# Patient Record
Sex: FEMALE | Race: WHITE | Employment: OTHER | URBAN - METROPOLITAN AREA
[De-identification: names, ages, dates, MRNs, and addresses within clinical notes are randomized per-mention and may not be internally consistent; named-entity substitution may affect disease eponyms.]

---

## 2019-05-22 ENCOUNTER — HOSPITAL ENCOUNTER (OUTPATIENT)
Dept: MAMMOGRAPHY | Age: 79
Discharge: HOME | End: 2019-05-22
Attending: FAMILY MEDICINE
Payer: MEDICARE

## 2019-05-22 DIAGNOSIS — Z12.39 BREAST SCREENING: ICD-10-CM

## 2019-05-22 PROCEDURE — 77067 SCR MAMMO BI INCL CAD: CPT

## 2019-09-19 ENCOUNTER — HOSPITAL ENCOUNTER (EMERGENCY)
Age: 79
Discharge: HOME | End: 2019-09-19
Attending: EMERGENCY MEDICINE
Payer: MEDICARE

## 2019-09-19 VITALS
BODY MASS INDEX: 23.92 KG/M2 | DIASTOLIC BLOOD PRESSURE: 61 MMHG | WEIGHT: 130 LBS | HEART RATE: 76 BPM | OXYGEN SATURATION: 99 % | HEIGHT: 62 IN | RESPIRATION RATE: 9 BRPM | SYSTOLIC BLOOD PRESSURE: 134 MMHG | TEMPERATURE: 97.5 F

## 2019-09-19 DIAGNOSIS — R21 RASH: Primary | ICD-10-CM

## 2019-09-19 LAB
ANION GAP SERPL CALC-SCNC: 6 MMOL/L (ref 5–15)
BASOPHILS # BLD: 0 K/UL (ref 0–0.1)
BASOPHILS NFR BLD: 1 % (ref 0–1)
BUN SERPL-MCNC: 18 MG/DL (ref 6–20)
BUN/CREAT SERPL: 20 (ref 12–20)
CALCIUM SERPL-MCNC: 9.2 MG/DL (ref 8.5–10.1)
CHLORIDE SERPL-SCNC: 105 MMOL/L (ref 97–108)
CO2 SERPL-SCNC: 25 MMOL/L (ref 21–32)
COMMENT, HOLDF: NORMAL
CREAT SERPL-MCNC: 0.9 MG/DL (ref 0.55–1.02)
DIFFERENTIAL METHOD BLD: ABNORMAL
EOSINOPHIL # BLD: 0 K/UL (ref 0–0.4)
EOSINOPHIL NFR BLD: 1 % (ref 0–7)
ERYTHROCYTE [DISTWIDTH] IN BLOOD BY AUTOMATED COUNT: 12.6 % (ref 11.5–14.5)
GLUCOSE SERPL-MCNC: 110 MG/DL (ref 65–100)
HCT VFR BLD AUTO: 48 % (ref 35–47)
HGB BLD-MCNC: 16.6 G/DL (ref 11.5–16)
IMM GRANULOCYTES # BLD AUTO: 0 K/UL (ref 0–0.04)
IMM GRANULOCYTES NFR BLD AUTO: 0 % (ref 0–0.5)
LYMPHOCYTES # BLD: 1.1 K/UL (ref 0.8–3.5)
LYMPHOCYTES NFR BLD: 14 % (ref 12–49)
MCH RBC QN AUTO: 33.6 PG (ref 26–34)
MCHC RBC AUTO-ENTMCNC: 34.6 G/DL (ref 30–36.5)
MCV RBC AUTO: 97.2 FL (ref 80–99)
MONOCYTES # BLD: 0.7 K/UL (ref 0–1)
MONOCYTES NFR BLD: 8 % (ref 5–13)
NEUTS SEG # BLD: 6.4 K/UL (ref 1.8–8)
NEUTS SEG NFR BLD: 76 % (ref 32–75)
NRBC # BLD: 0 K/UL (ref 0–0.01)
NRBC BLD-RTO: 0 PER 100 WBC
PLATELET # BLD AUTO: 163 K/UL (ref 150–400)
PMV BLD AUTO: 11.8 FL (ref 8.9–12.9)
POTASSIUM SERPL-SCNC: 4.4 MMOL/L (ref 3.5–5.1)
RBC # BLD AUTO: 4.94 M/UL (ref 3.8–5.2)
SAMPLES BEING HELD,HOLD: NORMAL
SODIUM SERPL-SCNC: 136 MMOL/L (ref 136–145)
WBC # BLD AUTO: 8.3 K/UL (ref 3.6–11)

## 2019-09-19 PROCEDURE — 85025 COMPLETE CBC W/AUTO DIFF WBC: CPT

## 2019-09-19 PROCEDURE — 80048 BASIC METABOLIC PNL TOTAL CA: CPT

## 2019-09-19 PROCEDURE — 99285 EMERGENCY DEPT VISIT HI MDM: CPT

## 2019-09-19 PROCEDURE — 74011250636 HC RX REV CODE- 250/636: Performed by: EMERGENCY MEDICINE

## 2019-09-19 PROCEDURE — 96375 TX/PRO/DX INJ NEW DRUG ADDON: CPT

## 2019-09-19 PROCEDURE — 96374 THER/PROPH/DIAG INJ IV PUSH: CPT

## 2019-09-19 PROCEDURE — 36415 COLL VENOUS BLD VENIPUNCTURE: CPT

## 2019-09-19 RX ORDER — DIPHENHYDRAMINE HYDROCHLORIDE 50 MG/ML
50 INJECTION, SOLUTION INTRAMUSCULAR; INTRAVENOUS
Status: COMPLETED | OUTPATIENT
Start: 2019-09-19 | End: 2019-09-19

## 2019-09-19 RX ORDER — DEXAMETHASONE SODIUM PHOSPHATE 4 MG/ML
10 INJECTION, SOLUTION INTRA-ARTICULAR; INTRALESIONAL; INTRAMUSCULAR; INTRAVENOUS; SOFT TISSUE ONCE
Status: DISCONTINUED | OUTPATIENT
Start: 2019-09-19 | End: 2019-09-19

## 2019-09-19 RX ORDER — FAMOTIDINE 10 MG/ML
20 INJECTION INTRAVENOUS
Status: COMPLETED | OUTPATIENT
Start: 2019-09-19 | End: 2019-09-19

## 2019-09-19 RX ADMIN — DIPHENHYDRAMINE HYDROCHLORIDE 50 MG: 50 INJECTION, SOLUTION INTRAMUSCULAR; INTRAVENOUS at 14:43

## 2019-09-19 RX ADMIN — FAMOTIDINE 20 MG: 10 INJECTION, SOLUTION INTRAVENOUS at 14:43

## 2019-09-19 NOTE — ED TRIAGE NOTES
Pt arrives via EMS for c/o allergic reaction/hives/itching to bilateral hands, scalp, back, and legs. Unknown cause. Pt states about a weak ago she dyed her hair and started having hives shortly after that have since gotten worse, states the same thing happened 6 weeks ago. Pt reports rash to lower right leg x3 months. Pt states she took 1/2 Benadryl PTA.

## 2019-09-19 NOTE — ED PROVIDER NOTES
[de-identified] y.o. female with past medical history significant for anxiety, HTN, diverticulitis, and high cholesterol who presents from home via EMS with chief complaint of itching. Pt states about 6 weeks ago she began feeling itchy on her hands, feet, and under her arms. 7 days ago she began with a rash on her feet, hands, and under her arms that feel like \"burning\", and like \"bugs were crawling on her scalp, and back\". Pt notes she believes it is from an adverse reaction to hair dye, but is unsure. She explains she wore gloves during the process, and did not drop anything on her feet. Pt states her rash continued to get worse, and in addition, noticed her feet were getting excessively dry. Pt states she tried 7 creams on her feet to try and make them better without success. Pt states she is SOB right now, but believe it is due to her anxiety and nervousness. Pt denies changes in her urine, or stool. She also denies beginning any new medications, and the hx of any STD's. Pt took 1/2 of a benedryl PTA There are no other acute medical concerns at this time. Social hx: Never Smoker, No EtOH use  PCP: Ariel Law MD      Note written by Nemo Hill, as dictated by Miranda Godfrey DO 1:30 PM      The history is provided by the patient. No  was used.         Past Medical History:   Diagnosis Date    Anxiety     Arthritis     Diverticulitis     High cholesterol     Hypertension        Past Surgical History:   Procedure Laterality Date    HX BREAST BIOPSY Left     40+years    HX COLECTOMY           Family History:   Problem Relation Age of Onset    Breast Cancer Maternal Aunt         66's    Breast Cancer Maternal Aunt        Social History     Socioeconomic History    Marital status:      Spouse name: Not on file    Number of children: Not on file    Years of education: Not on file    Highest education level: Not on file   Occupational History    Not on file   Social Needs    Financial resource strain: Not on file    Food insecurity:     Worry: Not on file     Inability: Not on file    Transportation needs:     Medical: Not on file     Non-medical: Not on file   Tobacco Use    Smoking status: Never Smoker   Substance and Sexual Activity    Alcohol use: No    Drug use: Not on file    Sexual activity: Not on file   Lifestyle    Physical activity:     Days per week: Not on file     Minutes per session: Not on file    Stress: Not on file   Relationships    Social connections:     Talks on phone: Not on file     Gets together: Not on file     Attends Anglican service: Not on file     Active member of club or organization: Not on file     Attends meetings of clubs or organizations: Not on file     Relationship status: Not on file    Intimate partner violence:     Fear of current or ex partner: Not on file     Emotionally abused: Not on file     Physically abused: Not on file     Forced sexual activity: Not on file   Other Topics Concern    Not on file   Social History Narrative    Not on file         ALLERGIES: Ciprocinonide; Ciprofloxacin; Hydrocodone; Mefoxin [cefoxitin]; Percodan [oxycodone hcl-oxycodone-asa]; Skelaxin [metaxalone]; and Sulfa (sulfonamide antibiotics)    Review of Systems   Constitutional: Negative for fever. HENT: Negative for hearing loss. Eyes: Negative for visual disturbance. Respiratory: Positive for shortness of breath. Cardiovascular: Negative for chest pain. Gastrointestinal: Negative for abdominal pain. Genitourinary: Negative for flank pain. Musculoskeletal: Negative for back pain. Skin: Positive for rash. Neurological: Negative for dizziness and light-headedness. Psychiatric/Behavioral: The patient is nervous/anxious.         Vitals:    09/19/19 1318   BP: (!) 138/92   Pulse: 86   Resp: 15   Temp: 97.5 °F (36.4 °C)   SpO2: 100%   Weight: 59 kg (130 lb)   Height: 5' 2\" (1.575 m)            Physical Exam   Constitutional: She is oriented to person, place, and time. She appears well-developed and well-nourished. No distress. HENT:   Head: Normocephalic and atraumatic. Mouth/Throat: Oropharynx is clear and moist.   Eyes: Pupils are equal, round, and reactive to light. Conjunctivae and EOM are normal. No scleral icterus. Neck: Neck supple. No JVD present. No tracheal deviation present. Cardiovascular: Normal rate and regular rhythm. Exam reveals no gallop. No murmur heard. Pulmonary/Chest: Effort normal and breath sounds normal. No respiratory distress. Abdominal: Soft. Bowel sounds are normal. She exhibits no distension. There is no tenderness. Musculoskeletal: Normal range of motion. She exhibits no edema, tenderness or deformity. Neurological: She is alert and oriented to person, place, and time. Skin: Skin is warm and dry. Capillary refill takes less than 2 seconds. Patient has a macular papular flat blanchable rash on the palmar surfaces of both hands, the dorsal surfaces of both feet as well as around the bilateral ankles. These rashes are described as feeling itchy   Psychiatric: She has a normal mood and affect. Her behavior is normal. Judgment and thought content normal.       Note written by Nemo Mercado, as dictated by Fareed Hope, DO 1:30 PM      MDM  Number of Diagnoses or Management Options  Rash: This patient is an 58-year-old female who arrives as above. During conversation after reviewing her physical exam I believe this to be some type of reaction as opposed to something infectious. I did prescribe the patient does have Benadryl, Pepcid and Decadron. During the Decadron infusion she did have an adverse reaction where she felt very dizzy and started having some feeling that she was having trouble breathing however she did not complain of her throat swelling or tongue swelling or anything. This lasted approximately 5 minutes and then she returned to her baseline.   She did continue to have some intermittent dizziness but on my last reevaluation, patient states that she felt totally back to her baseline. Her rash has resolved at this time. As for cause of her rash is unclear however I believe it something she is most likely contacting on a daily basis. She denies any type of new items other than the soap as described in the HPI. At this time I will discharge the patient. I have asked that she use Benadryl around-the-clock for the next 48 hours as well as Pepcid daily and follow-up with her primary care doctor in reference to an allergist.  She is agreeable to this plan and disposition is hemodynamically stable      Procedures    PROGRESS NOTE:    Patient's rash improving. She now feels slightly dizzy after the medical treatment. Fluids are running.

## 2019-09-19 NOTE — DISCHARGE INSTRUCTIONS
Please take 25 mg of Benadryl every 8 hours while awake for the next 48 hours. Please take Pepcid 20 mg daily.   Follow-up with your primary care doctor talk to them about seeing an allergist.

## 2019-09-21 ENCOUNTER — HOSPITAL ENCOUNTER (EMERGENCY)
Age: 79
Discharge: HOME | End: 2019-09-21
Attending: EMERGENCY MEDICINE
Payer: MEDICARE

## 2019-09-21 VITALS
HEIGHT: 62 IN | WEIGHT: 130 LBS | OXYGEN SATURATION: 99 % | RESPIRATION RATE: 16 BRPM | BODY MASS INDEX: 23.92 KG/M2 | DIASTOLIC BLOOD PRESSURE: 72 MMHG | TEMPERATURE: 97.6 F | HEART RATE: 73 BPM | SYSTOLIC BLOOD PRESSURE: 133 MMHG

## 2019-09-21 DIAGNOSIS — R21 RASH: ICD-10-CM

## 2019-09-21 DIAGNOSIS — T78.40XD ALLERGIC REACTION, SUBSEQUENT ENCOUNTER: Primary | ICD-10-CM

## 2019-09-21 PROCEDURE — 99285 EMERGENCY DEPT VISIT HI MDM: CPT

## 2019-09-21 PROCEDURE — 74011250636 HC RX REV CODE- 250/636: Performed by: EMERGENCY MEDICINE

## 2019-09-21 PROCEDURE — 96374 THER/PROPH/DIAG INJ IV PUSH: CPT

## 2019-09-21 PROCEDURE — 96375 TX/PRO/DX INJ NEW DRUG ADDON: CPT

## 2019-09-21 RX ORDER — FAMOTIDINE 10 MG/ML
20 INJECTION INTRAVENOUS
Status: COMPLETED | OUTPATIENT
Start: 2019-09-21 | End: 2019-09-21

## 2019-09-21 RX ORDER — DIPHENHYDRAMINE HYDROCHLORIDE 50 MG/ML
50 INJECTION, SOLUTION INTRAMUSCULAR; INTRAVENOUS
Status: COMPLETED | OUTPATIENT
Start: 2019-09-21 | End: 2019-09-21

## 2019-09-21 RX ORDER — MISOPROSTOL 200 UG/1
200 TABLET ORAL ONCE
Status: DISCONTINUED | OUTPATIENT
Start: 2019-09-21 | End: 2019-09-21

## 2019-09-21 RX ADMIN — FAMOTIDINE 20 MG: 10 INJECTION, SOLUTION INTRAVENOUS at 11:12

## 2019-09-21 RX ADMIN — DIPHENHYDRAMINE HYDROCHLORIDE 50 MG: 50 INJECTION, SOLUTION INTRAMUSCULAR; INTRAVENOUS at 11:24

## 2019-09-21 NOTE — ED TRIAGE NOTES
Pt arrives by ems with the c/c of being seen recently for an allergic reaction pt states hives had gone away \"some\" but not all the way, pt has hives to bilateral hands, pt c/c today was woke up this morning and went to [facility] on \" and started experiencing \"anxiety\" with tingling in hands; pt has history of anxiety.

## 2019-09-21 NOTE — ED PROVIDER NOTES
This patient is a 69-year-old female comes in with a rash to her hands her distal arms and her feet. Patient was seen here in this emergency department several days ago for the same thing and treated with Benadryl and Pepcid with resolution of her symptoms. She is continued to use p.o. Benadryl and p.o. Pepcid ever since however this morning she had an episode where she became flushed nauseous and had what she describes as a panic attack and then broke out in this rash again on her hands distal arm and feet. Patient denies any other complaints at this time other than the itching around the rash. There is no chest pain, shortness of breath, abdominal pain. She denies any lightheadedness or dizziness. Patient had a long conversation with the patient concerning possible contacts with lotions or new detergents or anything of the like and she cannot think of anything except for some Vaseline that she used as well as some foot cream.  No other acute complaints.            Past Medical History:   Diagnosis Date    Anxiety     Arthritis     Diverticulitis     High cholesterol     Hypertension        Past Surgical History:   Procedure Laterality Date    HX BREAST BIOPSY Left     40+years    HX COLECTOMY           Family History:   Problem Relation Age of Onset    Breast Cancer Maternal Aunt         66's    Breast Cancer Maternal Aunt        Social History     Socioeconomic History    Marital status:      Spouse name: Not on file    Number of children: Not on file    Years of education: Not on file    Highest education level: Not on file   Occupational History    Not on file   Social Needs    Financial resource strain: Not on file    Food insecurity:     Worry: Not on file     Inability: Not on file    Transportation needs:     Medical: Not on file     Non-medical: Not on file   Tobacco Use    Smoking status: Former Smoker    Smokeless tobacco: Never Used   Substance and Sexual Activity    Alcohol use: No    Drug use: Not on file    Sexual activity: Not on file   Lifestyle    Physical activity:     Days per week: Not on file     Minutes per session: Not on file    Stress: Not on file   Relationships    Social connections:     Talks on phone: Not on file     Gets together: Not on file     Attends Bahai service: Not on file     Active member of club or organization: Not on file     Attends meetings of clubs or organizations: Not on file     Relationship status: Not on file    Intimate partner violence:     Fear of current or ex partner: Not on file     Emotionally abused: Not on file     Physically abused: Not on file     Forced sexual activity: Not on file   Other Topics Concern    Not on file   Social History Narrative    Not on file         ALLERGIES: Decadron [dexamethasone sodium phosphate]; Ciprocinonide; Ciprofloxacin; Hydrocodone; Mefoxin [cefoxitin]; Percodan [oxycodone hcl-oxycodone-asa]; Skelaxin [metaxalone]; Solu-medrol [methylprednisolone sodium succ]; and Sulfa (sulfonamide antibiotics)    Review of Systems   Constitutional: Negative for fever. HENT: Negative for hearing loss. Eyes: Negative for visual disturbance. Respiratory: Negative for shortness of breath. Cardiovascular: Negative for chest pain. Gastrointestinal: Negative for abdominal pain. Genitourinary: Negative for flank pain. Musculoskeletal: Negative for back pain. Skin: Positive for rash. Neurological: Negative for dizziness and light-headedness. Vitals:    09/21/19 1145 09/21/19 1200 09/21/19 1215 09/21/19 1230   BP: 126/63 143/85 144/78 133/72   Pulse:       Resp:       Temp:       SpO2:  99% 100% 99%   Weight:       Height:                Physical Exam   Constitutional: She is oriented to person, place, and time. She appears well-developed and well-nourished. No distress. HENT:   Head: Normocephalic and atraumatic.    Mouth/Throat: Oropharynx is clear and moist.   Eyes: Pupils are equal, round, and reactive to light. Conjunctivae and EOM are normal. No scleral icterus. Neck: Neck supple. No JVD present. No tracheal deviation present. Cardiovascular: Normal rate and regular rhythm. Exam reveals no gallop. No murmur heard. Pulmonary/Chest: Effort normal and breath sounds normal. No respiratory distress. Abdominal: Soft. Bowel sounds are normal. She exhibits no distension. There is no tenderness. Musculoskeletal: Normal range of motion. She exhibits no edema, tenderness or deformity. Neurological: She is alert and oriented to person, place, and time. Skin: Skin is warm and dry. Capillary refill takes less than 2 seconds. Rash noted. Patient has a maculopapular rash to the palmar and dorsal surface of her hand and the distal 2 to 3 inches of the forearm. A similar rash is seen on the bilateral dorsal surfaces of the feet as well as on the medial and lateral aspects of the feet. These areas are blanchable described as feeling \"itchy\"   Psychiatric: She has a normal mood and affect. Her behavior is normal. Judgment and thought content normal.        MDM  Number of Diagnoses or Management Options  Allergic reaction, subsequent encounter:   Rash:     ED Course as of Sep 21 1927   Sat Sep 21, 2019   1112 I will withhold Decadron at this patient based on her adverse reaction several days ago    [GG]      ED Course User Index  [GG] Júnior Dannemora, DO     This patient is a 76-year-old female comes in as above. I saw the patient several days ago and treated for the same thing. Here her symptoms resolved with Benadryl and Pepcid. At this time I continue to tell the patient that I believe is most likely related to something she is touching such as a lotion or hand cream cream that she continues to use. He could be stabilizer in the Vaseline that she is using as well.   At this time patient's symptoms are totally resolved she is agreeable to stop using any type of hand cream for the next several days until she can see her primary care doctor and follow-up with an allergist.  Patient is encouraged to continue taking Pepcid daily and Benadryl as needed and return for any additional symptoms.   Disposition patient is hemodynamically stable and ambulatory  Procedures